# Patient Record
Sex: FEMALE | Race: BLACK OR AFRICAN AMERICAN | NOT HISPANIC OR LATINO | URBAN - METROPOLITAN AREA
[De-identification: names, ages, dates, MRNs, and addresses within clinical notes are randomized per-mention and may not be internally consistent; named-entity substitution may affect disease eponyms.]

---

## 2020-12-04 ENCOUNTER — OUTPATIENT (OUTPATIENT)
Dept: OUTPATIENT SERVICES | Facility: HOSPITAL | Age: 50
LOS: 1 days | End: 2020-12-04
Payer: COMMERCIAL

## 2020-12-04 VITALS
TEMPERATURE: 98 F | HEIGHT: 65.5 IN | DIASTOLIC BLOOD PRESSURE: 83 MMHG | SYSTOLIC BLOOD PRESSURE: 126 MMHG | HEART RATE: 79 BPM | OXYGEN SATURATION: 98 % | WEIGHT: 186.07 LBS | RESPIRATION RATE: 14 BRPM

## 2020-12-04 DIAGNOSIS — K08.409 PARTIAL LOSS OF TEETH, UNSPECIFIED CAUSE, UNSPECIFIED CLASS: Chronic | ICD-10-CM

## 2020-12-04 DIAGNOSIS — Z01.818 ENCOUNTER FOR OTHER PREPROCEDURAL EXAMINATION: ICD-10-CM

## 2020-12-04 DIAGNOSIS — N92.0 EXCESSIVE AND FREQUENT MENSTRUATION WITH REGULAR CYCLE: ICD-10-CM

## 2020-12-04 DIAGNOSIS — N80.0 ENDOMETRIOSIS OF UTERUS: ICD-10-CM

## 2020-12-04 DIAGNOSIS — Z98.890 OTHER SPECIFIED POSTPROCEDURAL STATES: Chronic | ICD-10-CM

## 2020-12-04 LAB — HCG UR QL: NEGATIVE — SIGNIFICANT CHANGE UP

## 2020-12-04 PROCEDURE — 81025 URINE PREGNANCY TEST: CPT

## 2020-12-04 PROCEDURE — G0463: CPT

## 2020-12-04 NOTE — H&P PST ADULT - NSICDXFAMILYHX_GEN_ALL_CORE_FT
FAMILY HISTORY:  Family history of stroke, Father -  Age 57  Family history of type 2 diabetes mellitus in father, Father -  Age 57  Family hx of hypertension, Mother -  Age 67

## 2020-12-04 NOTE — H&P PST ADULT - ASSESSMENT
50 year old female  (termination of pregnancy X 2) Pt notes she is a [employer] -  PMH HTN, Migraines, Stroke (Silent) - Endometriosis of Uterus - Excessive and frequent menstruation with regular cycle - presents for PST prior to Total Laparoscopic Hysterectomy with Bilateral Salpingectomy with Dr Dawkins on 2020.    50 year old female  (termination of pregnancy X 2) Pt notes she is a [employer] -  PMH HTN, Migraines, Stroke (Silent) - Endometriosis of Uterus - Excessive and frequent menstruation with regular cycle - presents for PST prior to Total Laparoscopic Hysterectomy with Bilateral Salpingectomy with Cysto with Dr Dawkins on 2020.

## 2020-12-04 NOTE — H&P PST ADULT - GENERAL COMMENTS
Denies any travel in the last 14 days - denies any exposure to anyone with known or suspected COVID - denies any COVID stmptoms

## 2020-12-04 NOTE — H&P PST ADULT - ATTENDING COMMENTS
pt presents for scheduled TLH/BS/cysto for AUB-L, explained risks of surgery including but not limited to hemorrhage, infection, and damage to nearby organs.  patient understands risks and agrees with plan of care, consent obtained and in chart

## 2020-12-04 NOTE — H&P PST ADULT - TRANSFUSION HX COMMENT, PROFILE
Pt notes she is a [employer] - REFUSES BLOOD PRODUCTS -Copy of blood refusal - durable power of  on chart - WILL ACCEPT OPTIMIZING RBC MASS (EPO THERAPY)

## 2020-12-04 NOTE — H&P PST ADULT - NSICDXPROBLEM_GEN_ALL_CORE_FT
PROBLEM DIAGNOSES  Problem: Excessive and frequent menstruation with regular cycle  Assessment and Plan: PST labs, EKG and medical clearance on chart from PCP. UcG Obtained in PST. Pt is a [employer] - will not accept blood products therefore Type & screen NOT obtained in PST. Pt instructed to stop any NSAIDS/herbal Supplements/baby ASA between now and procedure. May take Tylenol if needed for pain between now and procedure. She may take her Amlodipine with small sip of water morning of procedure. Pre-op instructions  as well as pre-op wash instructions given to pt with understanding verbalized. Pt instructed she needs to have COVID swab done 72-48 hours prior to procedure. Provided patient with phone number for approved [facility] COVID swab testing sites. Pt understands she will need to make appointment for COVID swab. Pt verbalizes understanding of all instructions given. All questions addressed with patient prior to her leaving PST department.     Problem: Endometriosis of uterus  Assessment and Plan: See Above Assessment and Plan (#1)

## 2020-12-04 NOTE — H&P PST ADULT - NEGATIVE ENMT SYMPTOMS
no post-nasal discharge/no abnormal taste sensation/no sinus symptoms/no vertigo/no hearing difficulty/no throat pain/no dysphagia

## 2020-12-04 NOTE — H&P PST ADULT - NSICDXPASTSURGICALHX_GEN_ALL_CORE_FT
PAST SURGICAL HISTORY:  H/O wisdom tooth extraction 11/16/2020    Two previous induced terminations of pregnancy

## 2020-12-04 NOTE — H&P PST ADULT - NSICDXPASTMEDICALHX_GEN_ALL_CORE_FT
PAST MEDICAL HISTORY:  Endometriosis of uterus     Excessive and frequent menstruation with regular cycle     Hypertension     Migraines     Stroke Pt notes she does not know when  she had stroke - notes finding picked up on a CT Scan and an MRI done June 2019 - when she was in the ER for HTN   - infarction noted on RIGHT Side

## 2020-12-04 NOTE — H&P PST ADULT - HISTORY OF PRESENT ILLNESS
50 year old female  (Termination of pregnancy X 2)  Pt notes she is a [employer] -  PMH HTN, Migraines, Stroke (Silent)  - Endometriosis of Uterus - Excessive and frequent menstruation with regular cycle - presents for PST prior to Total Laparoscopic Hysterectomy with Bilateral Salpingectomy with Dr Dawkins on 2020. Pt notes she has Fibroids with "heavy- heavy bleeding with menstrual cycles over the years as well as cramping;  and my uterine wall is flip - floppy." Following discussions with Dr Dawkins pt is electing for scheduled procedure. 50 year old female  (Termination of pregnancy X 2)  Pt notes she is a [employer] -  PMH HTN, Migraines, Stroke (Silent)  - Endometriosis of Uterus - Excessive and frequent menstruation with regular cycle - presents for PST prior to Total Laparoscopic Hysterectomy with Bilateral Salpingectomy and Cysto with Dr Dawkins on 2020. Pt notes she has Fibroids with "heavy- heavy bleeding with menstrual cycles over the years as well as cramping;  and my uterine wall is flip - floppy." Following discussions with Dr Dawkins pt is electing for scheduled procedure.

## 2020-12-08 ENCOUNTER — APPOINTMENT (OUTPATIENT)
Dept: DISASTER EMERGENCY | Facility: CLINIC | Age: 50
End: 2020-12-08

## 2020-12-08 DIAGNOSIS — Z01.818 ENCOUNTER FOR OTHER PREPROCEDURAL EXAMINATION: ICD-10-CM

## 2020-12-08 PROBLEM — Z00.00 ENCOUNTER FOR PREVENTIVE HEALTH EXAMINATION: Status: ACTIVE | Noted: 2020-12-08

## 2020-12-09 LAB — SARS-COV-2 N GENE NPH QL NAA+PROBE: NOT DETECTED

## 2020-12-10 ENCOUNTER — TRANSCRIPTION ENCOUNTER (OUTPATIENT)
Age: 50
End: 2020-12-10

## 2020-12-10 RX ORDER — HYDROMORPHONE HYDROCHLORIDE 2 MG/ML
0.5 INJECTION INTRAMUSCULAR; INTRAVENOUS; SUBCUTANEOUS ONCE
Refills: 0 | Status: DISCONTINUED | OUTPATIENT
Start: 2020-12-11 | End: 2020-12-11

## 2020-12-10 RX ORDER — SODIUM CHLORIDE 9 MG/ML
1000 INJECTION, SOLUTION INTRAVENOUS
Refills: 0 | Status: DISCONTINUED | OUTPATIENT
Start: 2020-12-11 | End: 2020-12-11

## 2020-12-10 RX ORDER — ONDANSETRON 8 MG/1
4 TABLET, FILM COATED ORAL ONCE
Refills: 0 | Status: DISCONTINUED | OUTPATIENT
Start: 2020-12-11 | End: 2020-12-11

## 2020-12-10 RX ORDER — ACETAMINOPHEN 500 MG
1000 TABLET ORAL ONCE
Refills: 0 | Status: COMPLETED | OUTPATIENT
Start: 2020-12-11 | End: 2020-12-11

## 2020-12-10 NOTE — ASU PATIENT PROFILE, ADULT - PMH
Endometriosis of uterus    Excessive and frequent menstruation with regular cycle    Hypertension    Migraines    Stroke  Pt notes she does not know when  she had stroke - notes finding picked up on a CT Scan and an MRI done June 2019 - when she was in the ER for HTN   - infarction noted on RIGHT Side

## 2020-12-11 ENCOUNTER — RESULT REVIEW (OUTPATIENT)
Age: 50
End: 2020-12-11

## 2020-12-11 ENCOUNTER — OUTPATIENT (OUTPATIENT)
Dept: OUTPATIENT SERVICES | Facility: HOSPITAL | Age: 50
LOS: 1 days | End: 2020-12-11
Payer: COMMERCIAL

## 2020-12-11 VITALS
HEART RATE: 74 BPM | DIASTOLIC BLOOD PRESSURE: 81 MMHG | RESPIRATION RATE: 14 BRPM | TEMPERATURE: 98 F | SYSTOLIC BLOOD PRESSURE: 137 MMHG | OXYGEN SATURATION: 100 % | HEIGHT: 65.5 IN | WEIGHT: 186.07 LBS

## 2020-12-11 VITALS
RESPIRATION RATE: 20 BRPM | HEART RATE: 70 BPM | DIASTOLIC BLOOD PRESSURE: 74 MMHG | SYSTOLIC BLOOD PRESSURE: 118 MMHG | OXYGEN SATURATION: 98 %

## 2020-12-11 DIAGNOSIS — Z98.890 OTHER SPECIFIED POSTPROCEDURAL STATES: Chronic | ICD-10-CM

## 2020-12-11 DIAGNOSIS — N80.0 ENDOMETRIOSIS OF UTERUS: ICD-10-CM

## 2020-12-11 DIAGNOSIS — Z01.818 ENCOUNTER FOR OTHER PREPROCEDURAL EXAMINATION: ICD-10-CM

## 2020-12-11 DIAGNOSIS — N92.0 EXCESSIVE AND FREQUENT MENSTRUATION WITH REGULAR CYCLE: ICD-10-CM

## 2020-12-11 DIAGNOSIS — K08.409 PARTIAL LOSS OF TEETH, UNSPECIFIED CAUSE, UNSPECIFIED CLASS: Chronic | ICD-10-CM

## 2020-12-11 LAB
HCT VFR BLD CALC: 32.6 % — LOW (ref 34.5–45)
HGB BLD-MCNC: 10.7 G/DL — LOW (ref 11.5–15.5)
MCHC RBC-ENTMCNC: 30.7 PG — SIGNIFICANT CHANGE UP (ref 27–34)
MCHC RBC-ENTMCNC: 32.8 GM/DL — SIGNIFICANT CHANGE UP (ref 32–36)
MCV RBC AUTO: 93.4 FL — SIGNIFICANT CHANGE UP (ref 80–100)
NRBC # BLD: 0 /100 WBCS — SIGNIFICANT CHANGE UP (ref 0–0)
PLATELET # BLD AUTO: 271 K/UL — SIGNIFICANT CHANGE UP (ref 150–400)
RBC # BLD: 3.49 M/UL — LOW (ref 3.8–5.2)
RBC # FLD: 12.9 % — SIGNIFICANT CHANGE UP (ref 10.3–14.5)
WBC # BLD: 11.29 K/UL — HIGH (ref 3.8–10.5)
WBC # FLD AUTO: 11.29 K/UL — HIGH (ref 3.8–10.5)

## 2020-12-11 PROCEDURE — 88307 TISSUE EXAM BY PATHOLOGIST: CPT

## 2020-12-11 PROCEDURE — C1889: CPT

## 2020-12-11 PROCEDURE — 82962 GLUCOSE BLOOD TEST: CPT

## 2020-12-11 PROCEDURE — 88307 TISSUE EXAM BY PATHOLOGIST: CPT | Mod: 26

## 2020-12-11 PROCEDURE — 36415 COLL VENOUS BLD VENIPUNCTURE: CPT

## 2020-12-11 PROCEDURE — 58571 TLH W/T/O 250 G OR LESS: CPT

## 2020-12-11 PROCEDURE — 88305 TISSUE EXAM BY PATHOLOGIST: CPT

## 2020-12-11 PROCEDURE — 85027 COMPLETE CBC AUTOMATED: CPT

## 2020-12-11 PROCEDURE — 88305 TISSUE EXAM BY PATHOLOGIST: CPT | Mod: 26

## 2020-12-11 RX ORDER — ACETAMINOPHEN 500 MG
100 TABLET ORAL
Qty: 0 | Refills: 0 | DISCHARGE
Start: 2020-12-11

## 2020-12-11 RX ORDER — CHOLECALCIFEROL (VITAMIN D3) 125 MCG
1 CAPSULE ORAL
Qty: 0 | Refills: 0 | DISCHARGE

## 2020-12-11 RX ORDER — BENZOYL PEROXIDE MICRONIZED 5.8 %
1 TOWELETTE (EA) TOPICAL
Qty: 0 | Refills: 0 | DISCHARGE

## 2020-12-11 RX ORDER — SODIUM CHLORIDE 9 MG/ML
1000 INJECTION, SOLUTION INTRAVENOUS
Refills: 0 | Status: DISCONTINUED | OUTPATIENT
Start: 2020-12-11 | End: 2020-12-11

## 2020-12-11 RX ORDER — AMLODIPINE BESYLATE 2.5 MG/1
1 TABLET ORAL
Qty: 0 | Refills: 0 | DISCHARGE

## 2020-12-11 RX ORDER — ATORVASTATIN CALCIUM 80 MG/1
1 TABLET, FILM COATED ORAL
Qty: 0 | Refills: 0 | DISCHARGE

## 2020-12-11 RX ORDER — CEFAZOLIN SODIUM 1 G
2000 VIAL (EA) INJECTION ONCE
Refills: 0 | Status: COMPLETED | OUTPATIENT
Start: 2020-12-11 | End: 2020-12-11

## 2020-12-11 RX ORDER — ASPIRIN/CALCIUM CARB/MAGNESIUM 324 MG
1 TABLET ORAL
Qty: 0 | Refills: 0 | DISCHARGE

## 2020-12-11 RX ADMIN — SODIUM CHLORIDE 75 MILLILITER(S): 9 INJECTION, SOLUTION INTRAVENOUS at 08:44

## 2020-12-11 RX ADMIN — Medication 1000 MILLIGRAM(S): at 17:40

## 2020-12-11 RX ADMIN — SODIUM CHLORIDE 75 MILLILITER(S): 9 INJECTION, SOLUTION INTRAVENOUS at 13:00

## 2020-12-11 RX ADMIN — Medication 400 MILLIGRAM(S): at 16:40

## 2020-12-11 NOTE — BRIEF OPERATIVE NOTE - NSICDXBRIEFPROCEDURE_GEN_ALL_CORE_FT
PROCEDURES:  Bilateral salpingectomy 11-Dec-2020 12:33:48  Sunny Dawkins  Hysterectomy, total, laparoscopic, with cystourethroscopy 11-Dec-2020 12:33:39  Sunny Dawkins

## 2020-12-11 NOTE — PRE-OP CHECKLIST - NS PREOP CHK HIBICLENS NA
Read on MTX and Achilles tendonitis.  Do the stretches for your Achilles tendon as shown to you.   #1:

## 2020-12-11 NOTE — BRIEF OPERATIVE NOTE - OPERATION/FINDINGS
10 week size retroverted uterus, left ovary with 2cm follicle, normal fallopian tubes bilaterally, normal stomach and liver edge, appendix not visualized, bilateral ureteral jets on cystoscopy

## 2020-12-11 NOTE — ASU DISCHARGE PLAN (ADULT/PEDIATRIC) - ACTIVITY LEVEL
No excercise/Nothing per vagina/No tub baths/No intercourse/No heavy lifting/No tampons/No weight bearing/No douching

## 2020-12-11 NOTE — ASU DISCHARGE PLAN (ADULT/PEDIATRIC) - CALL YOUR DOCTOR IF YOU HAVE ANY OF THE FOLLOWING:
Bleeding that does not stop/Fever greater than (need to indicate Fahrenheit or Celsius)/Nausea and vomiting that does not stop/Inability to tolerate liquids or foods/Pain not relieved by Medications

## 2020-12-11 NOTE — ASU DISCHARGE PLAN (ADULT/PEDIATRIC) - CARE PROVIDER_API CALL
Sunny Dawkins)  Obstetrics and Gynecology Obstetrics and Gynocology  [address] [place] [address]  Phone: [phone]  Fax: [phone]  Follow Up Time: